# Patient Record
Sex: FEMALE | Race: WHITE | NOT HISPANIC OR LATINO | Employment: OTHER | URBAN - METROPOLITAN AREA
[De-identification: names, ages, dates, MRNs, and addresses within clinical notes are randomized per-mention and may not be internally consistent; named-entity substitution may affect disease eponyms.]

---

## 2017-05-10 ENCOUNTER — OFFICE VISIT (OUTPATIENT)
Dept: FAMILY MEDICINE CLINIC | Facility: CLINIC | Age: 77
End: 2017-05-10

## 2017-05-10 VITALS
DIASTOLIC BLOOD PRESSURE: 64 MMHG | SYSTOLIC BLOOD PRESSURE: 112 MMHG | OXYGEN SATURATION: 97 % | TEMPERATURE: 98.1 F | WEIGHT: 138 LBS | HEART RATE: 54 BPM | BODY MASS INDEX: 22.99 KG/M2 | HEIGHT: 65 IN

## 2017-05-10 DIAGNOSIS — E78.5 DYSLIPIDEMIA: ICD-10-CM

## 2017-05-10 DIAGNOSIS — I48.0 PAROXYSMAL ATRIAL FIBRILLATION (HCC): ICD-10-CM

## 2017-05-10 DIAGNOSIS — E11.42 TYPE 2 DIABETES MELLITUS WITH DIABETIC POLYNEUROPATHY, WITHOUT LONG-TERM CURRENT USE OF INSULIN (HCC): ICD-10-CM

## 2017-05-10 DIAGNOSIS — F17.200 SMOKER: ICD-10-CM

## 2017-05-10 DIAGNOSIS — D69.3 CHRONIC ITP (IDIOPATHIC THROMBOCYTOPENIA) (HCC): ICD-10-CM

## 2017-05-10 DIAGNOSIS — I10 BENIGN ESSENTIAL HYPERTENSION: Primary | ICD-10-CM

## 2017-05-10 DIAGNOSIS — I73.9 PVD (PERIPHERAL VASCULAR DISEASE) (HCC): ICD-10-CM

## 2017-05-10 DIAGNOSIS — J44.9 COPD MIXED TYPE (HCC): ICD-10-CM

## 2017-05-10 PROCEDURE — 99204 OFFICE O/P NEW MOD 45 MIN: CPT | Performed by: FAMILY MEDICINE

## 2017-05-10 RX ORDER — ASPIRIN 81 MG/1
81 TABLET ORAL DAILY
COMMUNITY

## 2017-05-10 RX ORDER — BLOOD-GLUCOSE METER
EACH MISCELLANEOUS
Refills: 0 | COMMUNITY
Start: 2017-04-07

## 2017-05-10 RX ORDER — LANCETS 33 GAUGE
EACH MISCELLANEOUS
Refills: 3 | COMMUNITY
Start: 2017-04-07

## 2017-05-10 RX ORDER — CLOPIDOGREL BISULFATE 75 MG/1
TABLET ORAL
Refills: 0 | COMMUNITY
Start: 2017-04-08 | End: 2017-07-25 | Stop reason: SDUPTHER

## 2017-05-11 LAB
ALBUMIN SERPL-MCNC: 5 G/DL (ref 3.5–4.8)
ALBUMIN/GLOB SERPL: 2.4 {RATIO} (ref 1.2–2.2)
ALP SERPL-CCNC: 61 IU/L (ref 39–117)
ALT SERPL-CCNC: 10 IU/L (ref 0–32)
AST SERPL-CCNC: 6 IU/L (ref 0–40)
BASOPHILS # BLD AUTO: 0 X10E3/UL (ref 0–0.2)
BASOPHILS NFR BLD AUTO: 0 %
BILIRUB SERPL-MCNC: 0.8 MG/DL (ref 0–1.2)
BUN SERPL-MCNC: 20 MG/DL (ref 8–27)
BUN/CREAT SERPL: 20 (ref 12–28)
CALCIUM SERPL-MCNC: 9.8 MG/DL (ref 8.7–10.3)
CHLORIDE SERPL-SCNC: 94 MMOL/L (ref 96–106)
CHOLEST SERPL-MCNC: 174 MG/DL (ref 100–199)
CO2 SERPL-SCNC: 21 MMOL/L (ref 18–29)
CREAT SERPL-MCNC: 1.02 MG/DL (ref 0.57–1)
CREAT UR-MCNC: NORMAL MG/DL
EOSINOPHIL # BLD AUTO: 0 X10E3/UL (ref 0–0.4)
EOSINOPHIL NFR BLD AUTO: 0 %
ERYTHROCYTE [DISTWIDTH] IN BLOOD BY AUTOMATED COUNT: 13.6 % (ref 12.3–15.4)
GLOBULIN SER CALC-MCNC: 2.1 G/DL (ref 1.5–4.5)
GLUCOSE SERPL-MCNC: 131 MG/DL (ref 65–99)
HBA1C MFR BLD: 7.4 % (ref 4.8–5.6)
HCT VFR BLD AUTO: 39.5 % (ref 34–46.6)
HDLC SERPL-MCNC: 54 MG/DL
HGB BLD-MCNC: 13.7 G/DL (ref 11.1–15.9)
IMM GRANULOCYTES # BLD: 0 X10E3/UL (ref 0–0.1)
IMM GRANULOCYTES NFR BLD: 0 %
LDLC SERPL CALC-MCNC: 106 MG/DL (ref 0–99)
LYMPHOCYTES # BLD AUTO: 7.1 X10E3/UL (ref 0.7–3.1)
LYMPHOCYTES NFR BLD AUTO: 61 %
MCH RBC QN AUTO: 30 PG (ref 26.6–33)
MCHC RBC AUTO-ENTMCNC: 34.7 G/DL (ref 31.5–35.7)
MCV RBC AUTO: 87 FL (ref 79–97)
MICROALBUMIN UR-MCNC: NORMAL
MONOCYTES # BLD AUTO: 0.4 X10E3/UL (ref 0.1–0.9)
MONOCYTES NFR BLD AUTO: 3 %
NEUTROPHILS # BLD AUTO: 4.3 X10E3/UL (ref 1.4–7)
NEUTROPHILS NFR BLD AUTO: 36 %
PLATELET # BLD AUTO: 196 X10E3/UL (ref 150–379)
POTASSIUM SERPL-SCNC: 5.6 MMOL/L (ref 3.5–5.2)
PROT SERPL-MCNC: 7.1 G/DL (ref 6–8.5)
RBC # BLD AUTO: 4.56 X10E6/UL (ref 3.77–5.28)
SODIUM SERPL-SCNC: 133 MMOL/L (ref 134–144)
SPECIMEN STATUS: NORMAL
TRIGL SERPL-MCNC: 70 MG/DL (ref 0–149)
VLDLC SERPL CALC-MCNC: 14 MG/DL (ref 5–40)
WBC # BLD AUTO: 11.8 X10E3/UL (ref 3.4–10.8)

## 2017-05-16 DIAGNOSIS — I10 BENIGN ESSENTIAL HYPERTENSION: Primary | ICD-10-CM

## 2017-06-30 ENCOUNTER — APPOINTMENT (OUTPATIENT)
Dept: ONCOLOGY | Facility: CLINIC | Age: 77
End: 2017-06-30

## 2017-06-30 ENCOUNTER — APPOINTMENT (OUTPATIENT)
Dept: LAB | Facility: HOSPITAL | Age: 77
End: 2017-06-30

## 2017-07-25 ENCOUNTER — OFFICE VISIT (OUTPATIENT)
Dept: FAMILY MEDICINE CLINIC | Facility: CLINIC | Age: 77
End: 2017-07-25

## 2017-07-25 VITALS
HEIGHT: 65 IN | OXYGEN SATURATION: 98 % | DIASTOLIC BLOOD PRESSURE: 80 MMHG | BODY MASS INDEX: 24.16 KG/M2 | HEART RATE: 65 BPM | SYSTOLIC BLOOD PRESSURE: 124 MMHG | WEIGHT: 145 LBS | TEMPERATURE: 98.1 F

## 2017-07-25 DIAGNOSIS — E11.8 TYPE 2 DIABETES MELLITUS WITH COMPLICATION, WITHOUT LONG-TERM CURRENT USE OF INSULIN (HCC): Primary | ICD-10-CM

## 2017-07-25 DIAGNOSIS — G25.81 RESTLESS LEGS SYNDROME: ICD-10-CM

## 2017-07-25 DIAGNOSIS — L72.3 SEBACEOUS CYST: ICD-10-CM

## 2017-07-25 DIAGNOSIS — B35.3 TINEA PEDIS OF BOTH FEET: ICD-10-CM

## 2017-07-25 PROBLEM — E78.00 HYPERCHOLESTEREMIA: Status: ACTIVE | Noted: 2017-07-25

## 2017-07-25 PROBLEM — F41.9 ANXIETY: Status: ACTIVE | Noted: 2017-07-25

## 2017-07-25 PROBLEM — F32.A DEPRESSION: Status: ACTIVE | Noted: 2017-07-25

## 2017-07-25 PROBLEM — M54.50 LOW BACK PAIN: Status: ACTIVE | Noted: 2017-07-25

## 2017-07-25 LAB
POC CREATININE URINE: 50
POC MICROALBUMIN URINE: 80

## 2017-07-25 PROCEDURE — 99213 OFFICE O/P EST LOW 20 MIN: CPT | Performed by: FAMILY MEDICINE

## 2017-07-25 PROCEDURE — 82044 UR ALBUMIN SEMIQUANTITATIVE: CPT | Performed by: FAMILY MEDICINE

## 2017-07-25 RX ORDER — GLIPIZIDE 2.5 MG/1
2.5 TABLET, EXTENDED RELEASE ORAL DAILY
Qty: 90 TABLET | Refills: 3 | Status: SHIPPED | OUTPATIENT
Start: 2017-07-25 | End: 2017-11-13 | Stop reason: DRUGHIGH

## 2017-07-25 RX ORDER — ATORVASTATIN CALCIUM 20 MG/1
20 TABLET, FILM COATED ORAL DAILY
Qty: 90 TABLET | Refills: 3 | Status: SHIPPED | OUTPATIENT
Start: 2017-07-25

## 2017-07-25 RX ORDER — ROPINIROLE 2 MG/1
2 TABLET, FILM COATED ORAL NIGHTLY
Qty: 90 TABLET | Refills: 3 | Status: SHIPPED | OUTPATIENT
Start: 2017-07-25 | End: 2017-11-07

## 2017-07-25 RX ORDER — CLOTRIMAZOLE 1 %
CREAM (GRAM) TOPICAL 2 TIMES DAILY
Qty: 60 G | Refills: 5 | Status: SHIPPED | OUTPATIENT
Start: 2017-07-25

## 2017-07-25 RX ORDER — METOPROLOL TARTRATE 50 MG/1
50 TABLET, FILM COATED ORAL 2 TIMES DAILY
Qty: 180 TABLET | Refills: 3 | Status: SHIPPED | OUTPATIENT
Start: 2017-07-25

## 2017-07-25 RX ORDER — CLOPIDOGREL BISULFATE 75 MG/1
75 TABLET ORAL DAILY
Qty: 90 TABLET | Refills: 3 | Status: SHIPPED | OUTPATIENT
Start: 2017-07-25

## 2017-07-25 NOTE — PROGRESS NOTES
Subjective   Kasandra Fermin is a 77 y.o. female. Presents today for   Chief Complaint   Patient presents with   • Cyst     pt has cyst on her chest   • Restless Legs Syndrome     pt needs rx for restless legs syndrome       History of Present Illness  77 yoa female here for follow-up.  Needs refills; hx of dm2, due for dm2 foot check and mal/cr;  Patient with rls, out of requip and needs refills as cannot sleep.    Patient cyst on chest, Dr. Chatman had drained, given abx doing better.  Was getting cheesy material out.  Some redness, but retracting.    Review of Systems   Skin: Positive for rash.   Psychiatric/Behavioral: Positive for sleep disturbance.       The following portions of the patient's history were reviewed and updated as appropriate: allergies, current medications, past medical history and problem list.    Patient Active Problem List   Diagnosis   • Adjustment disorder with mixed anxiety and depressed mood   • Benign essential hypertension   • Chronic pain syndrome   • Chronic reflux esophagitis   • Dyslipidemia   • Gastric ulcer   • Pancreatitis   • Panic disorder without agoraphobia   • Peripheral neuropathy   • Restless legs syndrome   • Degeneration of thoracic disc without myelopathy   • Type 2 diabetes mellitus with diabetic polyneuropathy, without long-term current use of insulin   • Chronic pancreatitis   • Stroke   • Atrial fibrillation   • PVD (peripheral vascular disease)   • Chronic ITP (idiopathic thrombocytopenia)   • COPD mixed type   • Anxiety   • Chest pain   • Depression   • Hypercholesteremia   • Low back pain   • Urinary tract infection       Allergies   Allergen Reactions   • Penicillins        Current Outpatient Prescriptions on File Prior to Visit   Medication Sig Dispense Refill   • aspirin 81 MG EC tablet Take 81 mg by mouth Daily.     • Blood Glucose Monitoring Suppl (ONE TOUCH ULTRA 2) W/DEVICE kit U QD  0   • citalopram (CeleXA) 40 MG tablet Take  by mouth daily.     •  "clopidogrel (PLAVIX) 75 MG tablet TK 1 T PO  QAM  0   • glipiZIDE (GLUCOTROL) 2.5 MG 24 hr tablet      • metoprolol tartrate (LOPRESSOR) 50 MG tablet Take  by mouth 2 (Two) Times a Day.     • nitroglycerin (NITROSTAT) 0.4 MG SL tablet Place  under the tongue.     • omeprazole (PriLOSEC) 20 MG capsule Take  by mouth.     • ONE TOUCH ULTRA TEST test strip TEST QD UTD  3   • ONETOUCH DELICA LANCETS 33G misc TEST ONCE D UTD  3   • rOPINIRole (REQUIP) 2 MG tablet Take  by mouth.     • [DISCONTINUED] simvastatin (ZOCOR) 40 MG tablet Take  by mouth.       No current facility-administered medications on file prior to visit.        Objective   Vitals:    07/25/17 1347   BP: 124/80   BP Location: Left arm   Patient Position: Sitting   Cuff Size: Adult   Pulse: 65   Temp: 98.1 °F (36.7 °C)   TempSrc: Oral   SpO2: 98%   Weight: 145 lb (65.8 kg)   Height: 65\" (165.1 cm)       Physical Exam   Constitutional: She is oriented to person, place, and time. She appears well-developed and well-nourished.    Kasandra had a diabetic foot exam performed (see scanned report, tinea noted) today.   During the foot exam she had a monofilament test performed.  Neurological: She is alert and oriented to person, place, and time.   Skin: Skin is warm and dry.   Chest wall healing I&D site, no warmth, tenderness or drainage, small cyst on exam.   Psychiatric: She has a normal mood and affect. Her behavior is normal.   Nursing note and vitals reviewed.  POCT microalbumin   Order: [id number]   Status:  Final result   Visible to patient:  No (Not Released) Dx:  Type 2 diabetes mellitus with complic...      5d ago     Microalbumin, Urine 80   Creatinine, Urine 50   Resulting Agency [facility]      Specimen Collected: 07/25/17  2:49 PM Last Resulted: 07/25/17  2:49 PM                  Assessment/Plan   Kasandra was seen today for cyst and restless legs syndrome.    Diagnoses and all orders for this visit:    Type 2 diabetes mellitus " with complication, without long-term current use of insulin  -     POCT microalbumin    Tinea pedis of both feet  -     clotrimazole (LOTRIMIN) 1 % cream; Apply  topically 2 (Two) Times a Day. To both feet    Restless legs syndrome    Sebaceous cyst  -     mupirocin (BACTROBAN) 2 % ointment; Apply  topically 2 (Two) Times a Day. To chest wall until wound closes.    Other orders  -     rOPINIRole (REQUIP) 2 MG tablet; Take 1 tablet by mouth Every Night.  -     glipiZIDE (GLUCOTROL) 2.5 MG 24 hr tablet; Take 1 tablet by mouth Daily.  -     clopidogrel (PLAVIX) 75 MG tablet; Take 1 tablet by mouth Daily.  -     metoprolol tartrate (LOPRESSOR) 50 MG tablet; Take 1 tablet by mouth 2 (Two) Times a Day.  -     atorvastatin (LIPITOR) 20 MG tablet; Take 1 tablet by mouth Daily.    -mal/cr up date today;  Refilled meds   -bactroban for healing I&D site, f/u with surgeon         -Follow up: 6 months       Current Outpatient Prescriptions:   •  aspirin 81 MG EC tablet, Take 81 mg by mouth Daily., Disp: , Rfl:   •  Blood Glucose Monitoring Suppl (ONE TOUCH ULTRA 2) W/DEVICE kit, U QD, Disp: , Rfl: 0  •  citalopram (CeleXA) 40 MG tablet, Take  by mouth daily., Disp: , Rfl:   •  clopidogrel (PLAVIX) 75 MG tablet, TK 1 T PO  QAM, Disp: , Rfl: 0  •  glipiZIDE (GLUCOTROL) 2.5 MG 24 hr tablet, , Disp: , Rfl:   •  metoprolol tartrate (LOPRESSOR) 50 MG tablet, Take  by mouth 2 (Two) Times a Day., Disp: , Rfl:   •  nitroglycerin (NITROSTAT) 0.4 MG SL tablet, Place  under the tongue., Disp: , Rfl:   •  omeprazole (PriLOSEC) 20 MG capsule, Take  by mouth., Disp: , Rfl:   •  ONE TOUCH ULTRA TEST test strip, TEST QD UTD, Disp: , Rfl: 3  •  ONETOUCH DELICA LANCETS 33G misc, TEST ONCE D UTD, Disp: , Rfl: 3  •  rOPINIRole (REQUIP) 2 MG tablet, Take  by mouth., Disp: , Rfl:

## 2017-11-07 ENCOUNTER — OFFICE VISIT (OUTPATIENT)
Dept: FAMILY MEDICINE CLINIC | Facility: CLINIC | Age: 77
End: 2017-11-07

## 2017-11-07 VITALS
OXYGEN SATURATION: 96 % | HEART RATE: 63 BPM | WEIGHT: 148 LBS | TEMPERATURE: 97.7 F | HEIGHT: 65 IN | SYSTOLIC BLOOD PRESSURE: 130 MMHG | BODY MASS INDEX: 24.66 KG/M2 | DIASTOLIC BLOOD PRESSURE: 80 MMHG

## 2017-11-07 DIAGNOSIS — E78.00 HYPERCHOLESTEREMIA: ICD-10-CM

## 2017-11-07 DIAGNOSIS — E78.5 DYSLIPIDEMIA: ICD-10-CM

## 2017-11-07 DIAGNOSIS — G25.81 RLS (RESTLESS LEGS SYNDROME): ICD-10-CM

## 2017-11-07 DIAGNOSIS — D69.3 CHRONIC ITP (IDIOPATHIC THROMBOCYTOPENIA) (HCC): ICD-10-CM

## 2017-11-07 DIAGNOSIS — I10 BENIGN ESSENTIAL HYPERTENSION: ICD-10-CM

## 2017-11-07 DIAGNOSIS — Z23 IMMUNIZATION DUE: ICD-10-CM

## 2017-11-07 DIAGNOSIS — E11.42 TYPE 2 DIABETES MELLITUS WITH DIABETIC POLYNEUROPATHY, WITHOUT LONG-TERM CURRENT USE OF INSULIN (HCC): Primary | ICD-10-CM

## 2017-11-07 PROCEDURE — 90662 IIV NO PRSV INCREASED AG IM: CPT | Performed by: FAMILY MEDICINE

## 2017-11-07 PROCEDURE — G0008 ADMIN INFLUENZA VIRUS VAC: HCPCS | Performed by: FAMILY MEDICINE

## 2017-11-07 PROCEDURE — 99214 OFFICE O/P EST MOD 30 MIN: CPT | Performed by: FAMILY MEDICINE

## 2017-11-07 RX ORDER — GABAPENTIN 300 MG/1
300 CAPSULE ORAL NIGHTLY
Qty: 30 CAPSULE | Refills: 5 | Status: SHIPPED | OUTPATIENT
Start: 2017-11-07

## 2017-11-07 NOTE — PROGRESS NOTES
Subjective   Kasandra Fermin is a 77 y.o. female. Presents today for   Chief Complaint   Patient presents with   • Diabetes     PT HERE FOR 3 MONTH FOLLOW UP VISIT.       Diabetes   She presents for her follow-up diabetic visit. She has type 2 diabetes mellitus. Her disease course has been stable. Pertinent negatives for diabetes include no chest pain, no foot paresthesias and no foot ulcerations. Symptoms are stable. Risk factors for coronary artery disease include diabetes mellitus, dyslipidemia and hypertension. Current diabetic treatment includes diet and oral agent (monotherapy). She is following a diabetic diet. An ACE inhibitor/angiotensin II receptor blocker is not being taken.   Hyperlipidemia   This is a chronic problem. The current episode started more than 1 year ago. The problem is controlled. Recent lipid tests were reviewed and are normal. Exacerbating diseases include diabetes. Factors aggravating her hyperlipidemia include beta blockers. Associated symptoms include shortness of breath (STABLE AND BASELINE;  REPORTS STILL SMOKING AND NO INTEREST IN QUITTING). Pertinent negatives include no chest pain, focal sensory loss or focal weakness. The current treatment provides moderate improvement of lipids. There are no compliance problems.  Risk factors for coronary artery disease include dyslipidemia, diabetes mellitus and hypertension.   Hypertension   This is a chronic problem. The current episode started more than 1 year ago. The problem is unchanged. The problem is controlled. Associated symptoms include shortness of breath (STABLE AND BASELINE;  REPORTS STILL SMOKING AND NO INTEREST IN QUITTING). Pertinent negatives include no chest pain, orthopnea, palpitations, peripheral edema or PND. There are no associated agents to hypertension. Risk factors for coronary artery disease include diabetes mellitus and dyslipidemia. Past treatments include beta blockers. The current treatment provides moderate  improvement. There are no compliance problems.  Hypertensive end-organ damage includes kidney disease.   Hx of ITP, last check 196,000;  Has seen Dr. Reaves in past.    PATIENT CANNOT SLEEP DUE TO RLS, ROPINIROLE NOT HELPING.    Review of Systems   Respiratory: Positive for shortness of breath (STABLE AND BASELINE;  REPORTS STILL SMOKING AND NO INTEREST IN QUITTING).    Cardiovascular: Negative for chest pain, palpitations, orthopnea and PND.   Gastrointestinal: Positive for constipation. Negative for abdominal pain, blood in stool, nausea and vomiting.   Neurological: Negative for focal weakness and syncope.       The following portions of the patient's history were reviewed and updated as appropriate: allergies, current medications, past medical history, past social history and problem list.    Patient Active Problem List   Diagnosis   • Adjustment disorder with mixed anxiety and depressed mood   • Benign essential hypertension   • Chronic pain syndrome   • Chronic reflux esophagitis   • Dyslipidemia   • Gastric ulcer   • Pancreatitis   • Panic disorder without agoraphobia   • Peripheral neuropathy   • Restless legs syndrome   • Degeneration of thoracic disc without myelopathy   • Type 2 diabetes mellitus with diabetic polyneuropathy, without long-term current use of insulin   • Chronic pancreatitis   • Stroke   • Atrial fibrillation   • PVD (peripheral vascular disease)   • Chronic ITP (idiopathic thrombocytopenia)   • COPD mixed type   • Anxiety   • Chest pain   • Depression   • Hypercholesteremia   • Low back pain   • Urinary tract infection       Allergies   Allergen Reactions   • Penicillins        Current Outpatient Prescriptions on File Prior to Visit   Medication Sig Dispense Refill   • aspirin 81 MG EC tablet Take 81 mg by mouth Daily.     • atorvastatin (LIPITOR) 20 MG tablet Take 1 tablet by mouth Daily. 90 tablet 3   • Blood Glucose Monitoring Suppl (ONE TOUCH ULTRA 2) W/DEVICE kit U QD  0   •  "citalopram (CeleXA) 40 MG tablet Take  by mouth daily.     • clopidogrel (PLAVIX) 75 MG tablet Take 1 tablet by mouth Daily. 90 tablet 3   • clotrimazole (LOTRIMIN) 1 % cream Apply  topically 2 (Two) Times a Day. To both feet 60 g 5   • glipiZIDE (GLUCOTROL) 2.5 MG 24 hr tablet Take 1 tablet by mouth Daily. 90 tablet 3   • metoprolol tartrate (LOPRESSOR) 50 MG tablet Take 1 tablet by mouth 2 (Two) Times a Day. 180 tablet 3   • mupirocin (BACTROBAN) 2 % ointment Apply  topically 2 (Two) Times a Day. To chest wall until wound closes. 22 g 0   • nitroglycerin (NITROSTAT) 0.4 MG SL tablet Place  under the tongue.     • omeprazole (PriLOSEC) 20 MG capsule Take  by mouth.     • ONE TOUCH ULTRA TEST test strip TEST QD UTD  3   • ONETOUCH DELICA LANCETS 33G misc TEST ONCE D UTD  3   • [DISCONTINUED] rOPINIRole (REQUIP) 2 MG tablet Take 1 tablet by mouth Every Night. 90 tablet 3     No current facility-administered medications on file prior to visit.        Objective   Vitals:    11/07/17 1146   BP: 130/80   BP Location: Left arm   Patient Position: Sitting   Cuff Size: Adult   Pulse: 63   Temp: 97.7 °F (36.5 °C)   TempSrc: Oral   SpO2: 96%   Weight: 148 lb (67.1 kg)   Height: 65\" (165.1 cm)       Physical Exam   Constitutional: She appears well-developed and well-nourished.   HENT:   Head: Normocephalic and atraumatic.   Neck: Neck supple. No JVD present. No thyromegaly present.   Cardiovascular: Normal rate, regular rhythm and normal heart sounds.  Exam reveals no gallop and no friction rub.    No murmur heard.  Pulmonary/Chest: Effort normal and breath sounds normal. No respiratory distress. She has no wheezes. She has no rales.   Abdominal: Soft. Bowel sounds are normal. She exhibits no distension. There is no tenderness. There is no rebound and no guarding.   Musculoskeletal: She exhibits no edema.   Neurological: She is alert.   Skin: Skin is warm and dry.   Psychiatric: She has a normal mood and affect. Her behavior " is normal.   Nursing note and vitals reviewed.      Assessment/Plan   Kasandra was seen today for diabetes.    Diagnoses and all orders for this visit:    Type 2 diabetes mellitus with diabetic polyneuropathy, without long-term current use of insulin  -     Comprehensive Metabolic Panel  -     Lipid Panel  -     Hemoglobin A1c  -     CBC & Differential    Benign essential hypertension  -     Comprehensive Metabolic Panel  -     Lipid Panel  -     Hemoglobin A1c  -     CBC & Differential    Hypercholesteremia  -     Comprehensive Metabolic Panel  -     Lipid Panel  -     Hemoglobin A1c  -     CBC & Differential    Chronic ITP (idiopathic thrombocytopenia)  -     Comprehensive Metabolic Panel  -     Lipid Panel  -     Hemoglobin A1c  -     CBC & Differential    Dyslipidemia  -     Comprehensive Metabolic Panel  -     Lipid Panel  -     Hemoglobin A1c  -     CBC & Differential    RLS (restless legs syndrome)  -     gabapentin (NEURONTIN) 300 MG capsule; Take 1 capsule by mouth Every Night.  -     Comprehensive Metabolic Panel  -     Lipid Panel  -     Hemoglobin A1c  -     CBC & Differential    -asked about c-scope, but patient does not want; Given age, nilesh doesn't really want;  Will give FOBT d/w up to her, though as smoker is at risk.  -dm2 - continue medication, recheck labs  -HLD - continue statin, recheck lipids.  -hypertension - controlled, continue medications  -RLS - stop requip and try gabapentin  -up date flu today           -Follow up: 6 months       Current Outpatient Prescriptions:   •  aspirin 81 MG EC tablet, Take 81 mg by mouth Daily., Disp: , Rfl:   •  atorvastatin (LIPITOR) 20 MG tablet, Take 1 tablet by mouth Daily., Disp: 90 tablet, Rfl: 3  •  Blood Glucose Monitoring Suppl (ONE TOUCH ULTRA 2) W/DEVICE kit, U QD, Disp: , Rfl: 0  •  citalopram (CeleXA) 40 MG tablet, Take  by mouth daily., Disp: , Rfl:   •  clopidogrel (PLAVIX) 75 MG tablet, Take 1 tablet by mouth Daily., Disp: 90 tablet, Rfl: 3  •   clotrimazole (LOTRIMIN) 1 % cream, Apply  topically 2 (Two) Times a Day. To both feet, Disp: 60 g, Rfl: 5  •  glipiZIDE (GLUCOTROL) 2.5 MG 24 hr tablet, Take 1 tablet by mouth Daily., Disp: 90 tablet, Rfl: 3  •  metoprolol tartrate (LOPRESSOR) 50 MG tablet, Take 1 tablet by mouth 2 (Two) Times a Day., Disp: 180 tablet, Rfl: 3  •  mupirocin (BACTROBAN) 2 % ointment, Apply  topically 2 (Two) Times a Day. To chest wall until wound closes., Disp: 22 g, Rfl: 0  •  nitroglycerin (NITROSTAT) 0.4 MG SL tablet, Place  under the tongue., Disp: , Rfl:   •  omeprazole (PriLOSEC) 20 MG capsule, Take  by mouth., Disp: , Rfl:   •  ONE TOUCH ULTRA TEST test strip, TEST QD UTD, Disp: , Rfl: 3  •  ONETOUCH DELICA LANCETS 33G misc, TEST ONCE D UTD, Disp: , Rfl: 3  •  gabapentin (NEURONTIN) 300 MG capsule, Take 1 capsule by mouth Every Night., Disp: 30 capsule, Rfl: 5

## 2017-11-08 LAB
ALBUMIN SERPL-MCNC: 4.6 G/DL (ref 3.5–5.2)
ALBUMIN/GLOB SERPL: 2 G/DL
ALP SERPL-CCNC: 75 U/L (ref 39–117)
ALT SERPL-CCNC: 12 U/L (ref 1–33)
AST SERPL-CCNC: 5 U/L (ref 1–32)
BASOPHILS # BLD AUTO: 0.01 10*3/MM3 (ref 0–0.2)
BASOPHILS NFR BLD AUTO: 0.1 % (ref 0–1.5)
BILIRUB SERPL-MCNC: 0.7 MG/DL (ref 0.1–1.2)
BUN SERPL-MCNC: 19 MG/DL (ref 8–23)
BUN/CREAT SERPL: 17.8 (ref 7–25)
CALCIUM SERPL-MCNC: 9.5 MG/DL (ref 8.6–10.5)
CHLORIDE SERPL-SCNC: 99 MMOL/L (ref 98–107)
CHOLEST SERPL-MCNC: 145 MG/DL (ref 0–200)
CO2 SERPL-SCNC: 23.5 MMOL/L (ref 22–29)
CREAT SERPL-MCNC: 1.07 MG/DL (ref 0.57–1)
DIFFERENTIAL COMMENT: NORMAL
EOSINOPHIL # BLD AUTO: 0.02 10*3/MM3 (ref 0–0.7)
EOSINOPHIL NFR BLD AUTO: 0.2 % (ref 0.3–6.2)
ERYTHROCYTE [DISTWIDTH] IN BLOOD BY AUTOMATED COUNT: 13.7 % (ref 11.7–13)
GFR SERPLBLD CREATININE-BSD FMLA CKD-EPI: 49 ML/MIN/1.73
GFR SERPLBLD CREATININE-BSD FMLA CKD-EPI: 60 ML/MIN/1.73
GLOBULIN SER CALC-MCNC: 2.3 GM/DL
GLUCOSE SERPL-MCNC: 384 MG/DL (ref 65–99)
HBA1C MFR BLD: 10.5 % (ref 4.8–5.6)
HCT VFR BLD AUTO: 39.3 % (ref 35.6–45.5)
HDLC SERPL-MCNC: 38 MG/DL (ref 40–60)
HGB BLD-MCNC: 12.9 G/DL (ref 11.9–15.5)
IMM GRANULOCYTES # BLD: 0.02 10*3/MM3 (ref 0–0.03)
IMM GRANULOCYTES NFR BLD: 0.2 % (ref 0–0.5)
LDLC SERPL CALC-MCNC: 86 MG/DL (ref 0–100)
LYMPHOCYTES # BLD AUTO: 7.38 10*3/MM3 (ref 0.9–4.8)
LYMPHOCYTES NFR BLD AUTO: 75.9 % (ref 19.6–45.3)
MCH RBC QN AUTO: 29.9 PG (ref 26.9–32)
MCHC RBC AUTO-ENTMCNC: 32.8 G/DL (ref 32.4–36.3)
MCV RBC AUTO: 91.2 FL (ref 80.5–98.2)
MONOCYTES # BLD AUTO: 0.05 10*3/MM3 (ref 0.2–1.2)
MONOCYTES NFR BLD AUTO: 0.5 % (ref 5–12)
NEUTROPHILS # BLD AUTO: 2.24 10*3/MM3 (ref 1.9–8.1)
NEUTROPHILS NFR BLD AUTO: 23.1 % (ref 42.7–76)
NRBC BLD AUTO-RTO: 0 /100 WBC (ref 0–0)
PLATELET # BLD AUTO: 92 10*3/MM3 (ref 140–500)
PLATELET BLD QL SMEAR: NORMAL
POTASSIUM SERPL-SCNC: 5.3 MMOL/L (ref 3.5–5.2)
PROT SERPL-MCNC: 6.9 G/DL (ref 6–8.5)
RBC # BLD AUTO: 4.31 10*6/MM3 (ref 3.9–5.2)
RBC MORPH BLD: NORMAL
SODIUM SERPL-SCNC: 138 MMOL/L (ref 136–145)
TRIGL SERPL-MCNC: 106 MG/DL (ref 0–150)
VLDLC SERPL CALC-MCNC: 21.2 MG/DL (ref 5–40)
WBC # BLD AUTO: 9.72 10*3/MM3 (ref 4.5–10.7)

## 2017-11-11 NOTE — PROGRESS NOTES
Diabetes is very uncontrolled.  Start metformin 500mg 1po bid, work on diet and increase glipizide xl to 5mg 1 po daily.  Return in 3 months to recheck  Cholesterol is adequately controlled.  Mild kidney function decline, stable;  Avoid nsaids;  Tylenol okay to take  Rest of labs normal or stable.

## 2017-11-13 RX ORDER — GLIPIZIDE 5 MG/1
5 TABLET ORAL DAILY
Qty: 90 TABLET | Refills: 0 | Status: SHIPPED | OUTPATIENT
Start: 2017-11-13 | End: 2018-02-17 | Stop reason: SDUPTHER

## 2018-01-30 ENCOUNTER — OFFICE VISIT (OUTPATIENT)
Dept: FAMILY MEDICINE CLINIC | Facility: CLINIC | Age: 78
End: 2018-01-30

## 2018-01-30 VITALS
DIASTOLIC BLOOD PRESSURE: 62 MMHG | BODY MASS INDEX: 24.63 KG/M2 | HEART RATE: 72 BPM | TEMPERATURE: 98 F | WEIGHT: 148 LBS | OXYGEN SATURATION: 96 % | SYSTOLIC BLOOD PRESSURE: 126 MMHG

## 2018-01-30 DIAGNOSIS — R35.0 URINARY FREQUENCY: ICD-10-CM

## 2018-01-30 DIAGNOSIS — E11.42 TYPE 2 DIABETES MELLITUS WITH DIABETIC POLYNEUROPATHY, WITHOUT LONG-TERM CURRENT USE OF INSULIN (HCC): Primary | ICD-10-CM

## 2018-01-30 LAB
BILIRUB BLD-MCNC: NEGATIVE MG/DL
CLARITY, POC: CLEAR
COLOR UR: YELLOW
GLUCOSE UR STRIP-MCNC: NEGATIVE MG/DL
KETONES UR QL: NEGATIVE
LEUKOCYTE EST, POC: NEGATIVE
NITRITE UR-MCNC: NEGATIVE MG/ML
PH UR: 5.5 [PH] (ref 5–8)
PROT UR STRIP-MCNC: ABNORMAL MG/DL
RBC # UR STRIP: NEGATIVE /UL
SP GR UR: 1.01 (ref 1–1.03)
UROBILINOGEN UR QL: NORMAL

## 2018-01-30 PROCEDURE — 81003 URINALYSIS AUTO W/O SCOPE: CPT | Performed by: FAMILY MEDICINE

## 2018-01-30 PROCEDURE — 99214 OFFICE O/P EST MOD 30 MIN: CPT | Performed by: FAMILY MEDICINE

## 2018-01-30 NOTE — PROGRESS NOTES
Subjective   Kasandra Fermin is a 77 y.o. female. Presents today for   Chief Complaint   Patient presents with   • Follow-up     has headaches off and on.  Having some dizzy spells.  Insurance rec. life alert. Frequent nightand day urination.       Diabetes   She presents for her follow-up diabetic visit. She has type 2 diabetes mellitus. Her disease course has been worsening (Last a1c 10.5%;  not checking bs's). Hypoglycemia symptoms include dizziness. Associated symptoms include blurred vision, polydipsia, polyuria and visual change. Pertinent negatives for diabetes include no chest pain. Risk factors for coronary artery disease include diabetes mellitus, dyslipidemia and post-menopausal. Current diabetic treatment includes oral agent (dual therapy). She is compliant with treatment some of the time. She is following a generally unhealthy diet. An ACE inhibitor/angiotensin II receptor blocker is not being taken. Eye exam is current (told ok, went as blurry vision).     Also pmhx of htn, hld    Review of Systems   Eyes: Positive for blurred vision.   Respiratory: Negative for shortness of breath.    Cardiovascular: Negative for chest pain.   Endocrine: Positive for polydipsia and polyuria.   Musculoskeletal: Positive for gait problem.   Neurological: Positive for dizziness and light-headedness.       The following portions of the patient's history were reviewed and updated as appropriate: allergies, current medications, past medical history and problem list.    Patient Active Problem List   Diagnosis   • Adjustment disorder with mixed anxiety and depressed mood   • Benign essential hypertension   • Chronic pain syndrome   • Chronic reflux esophagitis   • Dyslipidemia   • Gastric ulcer   • Pancreatitis   • Panic disorder without agoraphobia   • Peripheral neuropathy   • Restless legs syndrome   • Degeneration of thoracic disc without myelopathy   • Type 2 diabetes mellitus with diabetic polyneuropathy, without  long-term current use of insulin   • Chronic pancreatitis   • Stroke   • Atrial fibrillation   • PVD (peripheral vascular disease)   • Chronic ITP (idiopathic thrombocytopenia)   • COPD mixed type   • Anxiety   • Chest pain   • Depression   • Hypercholesteremia   • Low back pain   • Urinary tract infection       Allergies   Allergen Reactions   • Penicillins        Current Outpatient Prescriptions on File Prior to Visit   Medication Sig Dispense Refill   • aspirin 81 MG EC tablet Take 81 mg by mouth Daily.     • atorvastatin (LIPITOR) 20 MG tablet Take 1 tablet by mouth Daily. 90 tablet 3   • Blood Glucose Monitoring Suppl (ONE TOUCH ULTRA 2) W/DEVICE kit U QD  0   • citalopram (CeleXA) 40 MG tablet Take  by mouth daily.     • clopidogrel (PLAVIX) 75 MG tablet Take 1 tablet by mouth Daily. 90 tablet 3   • clotrimazole (LOTRIMIN) 1 % cream Apply  topically 2 (Two) Times a Day. To both feet 60 g 5   • gabapentin (NEURONTIN) 300 MG capsule Take 1 capsule by mouth Every Night. 30 capsule 5   • glipiZIDE (GLUCOTROL) 5 MG tablet Take 1 tablet by mouth Daily. 90 tablet 0   • metFORMIN (GLUCOPHAGE) 500 MG tablet Take 1 tablet by mouth 2 (Two) Times a Day With Meals. 180 tablet 0   • metoprolol tartrate (LOPRESSOR) 50 MG tablet Take 1 tablet by mouth 2 (Two) Times a Day. 180 tablet 3   • mupirocin (BACTROBAN) 2 % ointment Apply  topically 2 (Two) Times a Day. To chest wall until wound closes. 22 g 0   • nitroglycerin (NITROSTAT) 0.4 MG SL tablet Place  under the tongue.     • omeprazole (PriLOSEC) 20 MG capsule Take  by mouth.     • ONE TOUCH ULTRA TEST test strip TEST QD UTD  3   • ONETOUCH DELICA LANCETS 33G misc TEST ONCE D UTD  3     No current facility-administered medications on file prior to visit.        Objective   Vitals:    01/30/18 1113   BP: 126/62   Pulse: 72   Temp: 98 °F (36.7 °C)   SpO2: 96%   Weight: 67.1 kg (148 lb)       Physical Exam   Constitutional: She appears well-developed and well-nourished.    HENT:   Head: Normocephalic and atraumatic.   Neck: Neck supple. No JVD present. No thyromegaly present.   Cardiovascular: Normal rate, regular rhythm and normal heart sounds.  Exam reveals no gallop and no friction rub.    No murmur heard.  Pulmonary/Chest: Effort normal and breath sounds normal. No respiratory distress. She has no wheezes. She has no rales.   Abdominal: Soft. Bowel sounds are normal. She exhibits no distension. There is no tenderness. There is no rebound and no guarding.   Musculoskeletal: She exhibits no edema.   Neurological: She is alert. Gait abnormal.   Skin: Skin is warm and dry.   Psychiatric: She has a normal mood and affect. Her behavior is normal.   Nursing note and vitals reviewed.    U/a negative    Assessment/Plan   Kasandra was seen today for follow-up.    Diagnoses and all orders for this visit:    Type 2 diabetes mellitus with diabetic polyneuropathy, without long-term current use of insulin  -     Hemoglobin A1c  -     Insulin Glargine (TOUJEO SOLOSTAR) 300 UNIT/ML solution pen-injector; Inject 10 Units under the skin Every Night.    Urinary frequency  -     POCT urinalysis dipstick, automated        -niece will start checking bs's  -suspect hyperglycemia why blurry vision and dizzy;   D/w healthy diet;   Start insulin 10 units daily and titrate.  Monitor for lows closely and d/w serious nature;  To call if low       -Follow up: 6 weeks       Current Outpatient Prescriptions:   •  aspirin 81 MG EC tablet, Take 81 mg by mouth Daily., Disp: , Rfl:   •  atorvastatin (LIPITOR) 20 MG tablet, Take 1 tablet by mouth Daily., Disp: 90 tablet, Rfl: 3  •  Blood Glucose Monitoring Suppl (ONE TOUCH ULTRA 2) W/DEVICE kit, U QD, Disp: , Rfl: 0  •  citalopram (CeleXA) 40 MG tablet, Take  by mouth daily., Disp: , Rfl:   •  clopidogrel (PLAVIX) 75 MG tablet, Take 1 tablet by mouth Daily., Disp: 90 tablet, Rfl: 3  •  clotrimazole (LOTRIMIN) 1 % cream, Apply  topically 2 (Two) Times a Day. To both  feet, Disp: 60 g, Rfl: 5  •  gabapentin (NEURONTIN) 300 MG capsule, Take 1 capsule by mouth Every Night., Disp: 30 capsule, Rfl: 5  •  glipiZIDE (GLUCOTROL) 5 MG tablet, Take 1 tablet by mouth Daily., Disp: 90 tablet, Rfl: 0  •  metFORMIN (GLUCOPHAGE) 500 MG tablet, Take 1 tablet by mouth 2 (Two) Times a Day With Meals., Disp: 180 tablet, Rfl: 0  •  metoprolol tartrate (LOPRESSOR) 50 MG tablet, Take 1 tablet by mouth 2 (Two) Times a Day., Disp: 180 tablet, Rfl: 3  •  mupirocin (BACTROBAN) 2 % ointment, Apply  topically 2 (Two) Times a Day. To chest wall until wound closes., Disp: 22 g, Rfl: 0  •  nitroglycerin (NITROSTAT) 0.4 MG SL tablet, Place  under the tongue., Disp: , Rfl:   •  omeprazole (PriLOSEC) 20 MG capsule, Take  by mouth., Disp: , Rfl:   •  ONE TOUCH ULTRA TEST test strip, TEST QD UTD, Disp: , Rfl: 3  •  ONETOUCH DELICA LANCETS 33G misc, TEST ONCE D UTD, Disp: , Rfl: 3  •  Insulin Glargine (TOUJEO SOLOSTAR) 300 UNIT/ML solution pen-injector, Inject 10 Units under the skin Every Night., Disp: , Rfl:

## 2018-01-31 LAB — HBA1C MFR BLD: 8.1 % (ref 4.8–5.6)

## 2018-01-31 NOTE — PROGRESS NOTES
Call results to patient.  A1C did drop from 10 to 8.1, not quite goal.  Monitor blood sugars now as discussed and watch for lows.

## 2018-02-19 RX ORDER — GLIPIZIDE 5 MG/1
TABLET ORAL
Qty: 90 TABLET | Refills: 1 | Status: SHIPPED | OUTPATIENT
Start: 2018-02-19

## 2018-03-15 ENCOUNTER — OFFICE VISIT (OUTPATIENT)
Dept: FAMILY MEDICINE CLINIC | Facility: CLINIC | Age: 78
End: 2018-03-15

## 2018-03-15 VITALS
HEART RATE: 64 BPM | HEIGHT: 65 IN | DIASTOLIC BLOOD PRESSURE: 70 MMHG | SYSTOLIC BLOOD PRESSURE: 128 MMHG | OXYGEN SATURATION: 97 % | WEIGHT: 142 LBS | TEMPERATURE: 97.5 F | BODY MASS INDEX: 23.66 KG/M2

## 2018-03-15 DIAGNOSIS — K05.10 GINGIVITIS: ICD-10-CM

## 2018-03-15 DIAGNOSIS — E11.42 TYPE 2 DIABETES MELLITUS WITH DIABETIC POLYNEUROPATHY, WITH LONG-TERM CURRENT USE OF INSULIN (HCC): Primary | ICD-10-CM

## 2018-03-15 DIAGNOSIS — Z79.4 TYPE 2 DIABETES MELLITUS WITH DIABETIC POLYNEUROPATHY, WITH LONG-TERM CURRENT USE OF INSULIN (HCC): Primary | ICD-10-CM

## 2018-03-15 LAB — HBA1C MFR BLD: 7.2 % (ref 4.8–5.6)

## 2018-03-15 PROCEDURE — 99213 OFFICE O/P EST LOW 20 MIN: CPT | Performed by: FAMILY MEDICINE

## 2018-03-15 RX ORDER — CHLORHEXIDINE GLUCONATE 0.12 MG/ML
RINSE ORAL
Qty: 473 ML | Refills: 3 | Status: SHIPPED | OUTPATIENT
Start: 2018-03-15

## 2018-03-15 NOTE — PROGRESS NOTES
Subjective   Kasandra Fermin is a 77 y.o. female. Presents today for   Chief Complaint   Patient presents with   • Diabetes     PT HERE FOR 6 WEEK FOLLOW UP VISIT.       History of Present Illness  Patient here for f/u;  BS's doing better and tolerating insulin.  Needs more samples;  No cp/soa;  Reports still vision changes (blurry), has not seen opthalmology back as told nothing could do (subsequently identified uncontrolle diabetes).  Not wearing dentures as gums very irritated on right side.  Has not tried anything for this.    Review of Systems   Eyes: Positive for visual disturbance.   Respiratory: Negative for shortness of breath.    Cardiovascular: Negative for chest pain.       The following portions of the patient's history were reviewed and updated as appropriate: allergies, current medications, past medical history and problem list.    Patient Active Problem List   Diagnosis   • Adjustment disorder with mixed anxiety and depressed mood   • Benign essential hypertension   • Chronic pain syndrome   • Chronic reflux esophagitis   • Dyslipidemia   • Gastric ulcer   • Pancreatitis   • Panic disorder without agoraphobia   • Peripheral neuropathy   • Restless legs syndrome   • Degeneration of thoracic disc without myelopathy   • Type 2 diabetes mellitus with diabetic polyneuropathy, with long-term current use of insulin   • Chronic pancreatitis   • Stroke   • Atrial fibrillation   • PVD (peripheral vascular disease)   • Chronic ITP (idiopathic thrombocytopenia)   • COPD mixed type   • Anxiety   • Chest pain   • Depression   • Hypercholesteremia   • Low back pain   • Urinary tract infection       Allergies   Allergen Reactions   • Penicillins Other (See Comments)     PT DOES NOT REMEMBER HER REACTION.       Current Outpatient Prescriptions on File Prior to Visit   Medication Sig Dispense Refill   • aspirin 81 MG EC tablet Take 81 mg by mouth Daily.     • atorvastatin (LIPITOR) 20 MG tablet Take 1 tablet by mouth  "Daily. 90 tablet 3   • Blood Glucose Monitoring Suppl (ONE TOUCH ULTRA 2) W/DEVICE kit U QD  0   • citalopram (CeleXA) 40 MG tablet Take  by mouth daily.     • clopidogrel (PLAVIX) 75 MG tablet Take 1 tablet by mouth Daily. 90 tablet 3   • clotrimazole (LOTRIMIN) 1 % cream Apply  topically 2 (Two) Times a Day. To both feet 60 g 5   • gabapentin (NEURONTIN) 300 MG capsule Take 1 capsule by mouth Every Night. 30 capsule 5   • glipiZIDE (GLUCOTROL) 5 MG tablet TAKE 1 TABLET BY MOUTH DAILY 90 tablet 1   • Insulin Glargine (TOUJEO SOLOSTAR) 300 UNIT/ML solution pen-injector Inject 10 Units under the skin Every Night.     • metFORMIN (GLUCOPHAGE) 500 MG tablet TAKE 1 TABLET BY MOUTH TWICE DAILY WITH MEALS 180 tablet 1   • metoprolol tartrate (LOPRESSOR) 50 MG tablet Take 1 tablet by mouth 2 (Two) Times a Day. 180 tablet 3   • mupirocin (BACTROBAN) 2 % ointment Apply  topically 2 (Two) Times a Day. To chest wall until wound closes. 22 g 0   • nitroglycerin (NITROSTAT) 0.4 MG SL tablet Place  under the tongue.     • omeprazole (PriLOSEC) 20 MG capsule Take  by mouth.     • ONE TOUCH ULTRA TEST test strip TEST QD UTD  3   • ONETOUCH DELICA LANCETS 33G misc TEST ONCE D UTD  3     No current facility-administered medications on file prior to visit.        Objective   Vitals:    03/15/18 1054   BP: 128/70   BP Location: Left arm   Patient Position: Sitting   Cuff Size: Adult   Pulse: 64   Temp: 97.5 °F (36.4 °C)   TempSrc: Oral   SpO2: 97%   Weight: 64.4 kg (142 lb)   Height: 165.1 cm (65\")       Physical Exam   Constitutional: She is oriented to person, place, and time. She appears well-developed and well-nourished.   HENT:   Mouth/Throat: Oropharynx is clear and moist.   Edentulous;  Gums irritated.   Neurological: She is alert and oriented to person, place, and time.   Skin: Skin is warm and dry.   Psychiatric: She has a normal mood and affect. Her behavior is normal.   Nursing note and vitals reviewed.      Assessment/Plan "   Kasandra was seen today for diabetes.    Diagnoses and all orders for this visit:    Type 2 diabetes mellitus with diabetic polyneuropathy, with long-term current use of insulin  -     Hemoglobin A1c  -     Ambulatory Referral to Ophthalmology    Gingivitis  -     chlorhexidine (PERIDEX) 0.12 % solution; 15ML (3 TEASPOONS) SWISH & SPIT 3 TIMES DAILY AS NEEDED    -refer to ophthalmology for recheck;  Continue work on diabetic control;  Will recheck a1c today to see if trending down  -peridex swish and spit         -Follow up: 3 months       Current Outpatient Prescriptions:   •  aspirin 81 MG EC tablet, Take 81 mg by mouth Daily., Disp: , Rfl:   •  atorvastatin (LIPITOR) 20 MG tablet, Take 1 tablet by mouth Daily., Disp: 90 tablet, Rfl: 3  •  Blood Glucose Monitoring Suppl (ONE TOUCH ULTRA 2) W/DEVICE kit, U QD, Disp: , Rfl: 0  •  citalopram (CeleXA) 40 MG tablet, Take  by mouth daily., Disp: , Rfl:   •  clopidogrel (PLAVIX) 75 MG tablet, Take 1 tablet by mouth Daily., Disp: 90 tablet, Rfl: 3  •  clotrimazole (LOTRIMIN) 1 % cream, Apply  topically 2 (Two) Times a Day. To both feet, Disp: 60 g, Rfl: 5  •  gabapentin (NEURONTIN) 300 MG capsule, Take 1 capsule by mouth Every Night., Disp: 30 capsule, Rfl: 5  •  glipiZIDE (GLUCOTROL) 5 MG tablet, TAKE 1 TABLET BY MOUTH DAILY, Disp: 90 tablet, Rfl: 1  •  Insulin Glargine (TOUJEO SOLOSTAR) 300 UNIT/ML solution pen-injector, Inject 10 Units under the skin Every Night., Disp: , Rfl:   •  metFORMIN (GLUCOPHAGE) 500 MG tablet, TAKE 1 TABLET BY MOUTH TWICE DAILY WITH MEALS, Disp: 180 tablet, Rfl: 1  •  metoprolol tartrate (LOPRESSOR) 50 MG tablet, Take 1 tablet by mouth 2 (Two) Times a Day., Disp: 180 tablet, Rfl: 3  •  mupirocin (BACTROBAN) 2 % ointment, Apply  topically 2 (Two) Times a Day. To chest wall until wound closes., Disp: 22 g, Rfl: 0  •  nitroglycerin (NITROSTAT) 0.4 MG SL tablet, Place  under the tongue., Disp: , Rfl:   •  omeprazole (PriLOSEC) 20 MG capsule, Take   by mouth., Disp: , Rfl:   •  ONE TOUCH ULTRA TEST test strip, TEST QD UTD, Disp: , Rfl: 3  •  ONETOUCH DELICA LANCETS 33G misc, TEST ONCE D UTD, Disp: , Rfl: 3  •  chlorhexidine (PERIDEX) 0.12 % solution, 15ML (3 TEASPOONS) SWISH & SPIT 3 TIMES DAILY AS NEEDED, Disp: 473 mL, Rfl: 3

## 2018-05-01 ENCOUNTER — TELEPHONE (OUTPATIENT)
Dept: FAMILY MEDICINE CLINIC | Facility: CLINIC | Age: 78
End: 2018-05-01

## 2018-05-17 ENCOUNTER — TELEPHONE (OUTPATIENT)
Dept: FAMILY MEDICINE CLINIC | Facility: CLINIC | Age: 78
End: 2018-05-17

## 2018-05-18 NOTE — TELEPHONE ENCOUNTER
I do not think [facility] does this, if has [facility] currently can ask for  to give names of senior assistance companies, but not something medicare will pay for.  This is out of pocket.    RRJ

## 2018-05-21 NOTE — TELEPHONE ENCOUNTER
Pt informed and she said she will pay out of pocket.  I will ask vna for any names and call her back with name and number. [email or web address]

## 2018-06-25 ENCOUNTER — TELEPHONE (OUTPATIENT)
Dept: FAMILY MEDICINE CLINIC | Facility: CLINIC | Age: 78
End: 2018-06-25

## 2018-06-25 NOTE — TELEPHONE ENCOUNTER
Spoke with family and she would like vna to call her and come to evaluate please.  To call Kira voss. [email or web address]

## 2018-06-25 NOTE — TELEPHONE ENCOUNTER
Start by calling nursing home interested in moving into and see if qualifies, ask for admission coordinator or .  We can call [facility] to check on patient and PT/OT to help with ADL and also have  come and discuss options.    GREG

## 2019-01-08 ENCOUNTER — LAB REQUISITION (OUTPATIENT)
Dept: LAB | Facility: HOSPITAL | Age: 79
End: 2019-01-08

## 2019-01-08 DIAGNOSIS — Z00.00 ROUTINE GENERAL MEDICAL EXAMINATION AT A HEALTH CARE FACILITY: ICD-10-CM

## 2019-01-08 LAB
ALBUMIN SERPL-MCNC: 3.9 G/DL (ref 3.5–5.2)
ALBUMIN/GLOB SERPL: 1.8 G/DL
ALP SERPL-CCNC: 60 U/L (ref 39–117)
ALT SERPL W P-5'-P-CCNC: 14 U/L (ref 1–33)
ANION GAP SERPL CALCULATED.3IONS-SCNC: 20 MMOL/L
ANISOCYTOSIS BLD QL: NORMAL
AST SERPL-CCNC: 12 U/L (ref 1–32)
BASOPHILS # BLD AUTO: 0.02 10*3/MM3 (ref 0–0.2)
BASOPHILS NFR BLD AUTO: 0.2 % (ref 0–1.5)
BILIRUB SERPL-MCNC: 1.9 MG/DL (ref 0.1–1.2)
BUN BLD-MCNC: 50 MG/DL (ref 8–23)
BUN/CREAT SERPL: 41 (ref 7–25)
CALCIUM SPEC-SCNC: 8.9 MG/DL (ref 8.6–10.5)
CHLORIDE SERPL-SCNC: 103 MMOL/L (ref 98–107)
CO2 SERPL-SCNC: 17 MMOL/L (ref 22–29)
CREAT BLD-MCNC: 1.22 MG/DL (ref 0.57–1)
DEPRECATED RDW RBC AUTO: 77.5 FL (ref 37–54)
EOSINOPHIL # BLD AUTO: 0 10*3/MM3 (ref 0–0.7)
EOSINOPHIL NFR BLD AUTO: 0 % (ref 0.3–6.2)
ERYTHROCYTE [DISTWIDTH] IN BLOOD BY AUTOMATED COUNT: 20.7 % (ref 11.7–13)
GFR SERPL CREATININE-BSD FRML MDRD: 42 ML/MIN/1.73
GLOBULIN UR ELPH-MCNC: 2.2 GM/DL
GLUCOSE BLD-MCNC: 311 MG/DL (ref 65–99)
HCT VFR BLD AUTO: 9.6 % (ref 35.6–45.5)
HGB BLD-MCNC: 3.1 G/DL (ref 11.9–15.5)
HYPOCHROMIA BLD QL: NORMAL
IMM GRANULOCYTES # BLD AUTO: 0.05 10*3/MM3 (ref 0–0.03)
IMM GRANULOCYTES NFR BLD AUTO: 0.4 % (ref 0–0.5)
LYMPHOCYTES # BLD AUTO: 9.71 10*3/MM3 (ref 0.9–4.8)
LYMPHOCYTES NFR BLD AUTO: 73.2 % (ref 19.6–45.3)
MCH RBC QN AUTO: 33.7 PG (ref 26.9–32)
MCHC RBC AUTO-ENTMCNC: 32.3 G/DL (ref 32.4–36.3)
MCV RBC AUTO: 104.3 FL (ref 80.5–98.2)
MONOCYTES # BLD AUTO: 0.3 10*3/MM3 (ref 0.2–1.2)
MONOCYTES NFR BLD AUTO: 2.3 % (ref 5–12)
NEUTROPHILS # BLD AUTO: 3.23 10*3/MM3 (ref 1.9–8.1)
NEUTROPHILS NFR BLD AUTO: 24.3 % (ref 42.7–76)
PLAT MORPH BLD: NORMAL
PLATELET # BLD AUTO: 327 10*3/MM3 (ref 140–500)
PMV BLD AUTO: 11 FL (ref 6–12)
POTASSIUM BLD-SCNC: 4.7 MMOL/L (ref 3.5–5.2)
PROT SERPL-MCNC: 6.1 G/DL (ref 6–8.5)
RBC # BLD AUTO: 0.92 10*6/MM3 (ref 3.9–5.2)
SODIUM BLD-SCNC: 140 MMOL/L (ref 136–145)
WBC MORPH BLD: NORMAL
WBC NRBC COR # BLD: 13.26 10*3/MM3 (ref 4.5–10.7)

## 2019-01-08 PROCEDURE — 85025 COMPLETE CBC W/AUTO DIFF WBC: CPT

## 2019-01-08 PROCEDURE — 85007 BL SMEAR W/DIFF WBC COUNT: CPT

## 2019-01-08 PROCEDURE — 80053 COMPREHEN METABOLIC PANEL: CPT
